# Patient Record
Sex: FEMALE | Race: WHITE | NOT HISPANIC OR LATINO | URBAN - METROPOLITAN AREA
[De-identification: names, ages, dates, MRNs, and addresses within clinical notes are randomized per-mention and may not be internally consistent; named-entity substitution may affect disease eponyms.]

---

## 2023-08-22 ENCOUNTER — HOSPITAL ENCOUNTER (EMERGENCY)
Facility: HOSPITAL | Age: 43
Discharge: HOME | End: 2023-08-22
Attending: EMERGENCY MEDICINE
Payer: MEDICAID

## 2023-08-22 VITALS
SYSTOLIC BLOOD PRESSURE: 103 MMHG | DIASTOLIC BLOOD PRESSURE: 73 MMHG | RESPIRATION RATE: 16 BRPM | HEART RATE: 93 BPM | TEMPERATURE: 98 F | OXYGEN SATURATION: 97 %

## 2023-08-22 DIAGNOSIS — R52 PAIN AGGRAVATED BY PHYSICAL ACTIVITY: ICD-10-CM

## 2023-08-22 DIAGNOSIS — M25.552 PAIN OF LEFT HIP: Primary | ICD-10-CM

## 2023-08-22 PROCEDURE — 99283 EMERGENCY DEPT VISIT LOW MDM: CPT

## 2023-08-22 RX ORDER — KETOROLAC TROMETHAMINE 10 MG/1
10 TABLET, FILM COATED ORAL EVERY 6 HOURS
Qty: 20 TABLET | Refills: 0 | Status: SHIPPED | OUTPATIENT
Start: 2023-08-22 | End: 2023-08-27

## 2023-08-22 NOTE — ED PROVIDER NOTES
Encounter Date: 8/22/2023       History     Chief Complaint   Patient presents with    Hip Pain     Lt hip pain  worsening each week from a fall a few weeks ago.-  lmp- 8/3/23     43-year-old complaints of left hip pain    The history is provided by the patient. No  was used.     Review of patient's allergies indicates:  No Known Allergies  No past medical history on file.  No past surgical history on file.  No family history on file.     Review of Systems   Constitutional: Negative.    HENT: Negative.     Eyes: Negative.    Respiratory: Negative.     Cardiovascular: Negative.    Gastrointestinal: Negative.    Endocrine: Negative.    Genitourinary: Negative.    Musculoskeletal:  Positive for joint swelling and myalgias.   Skin: Negative.    Allergic/Immunologic: Negative.    Neurological: Negative.    Hematological: Negative.    Psychiatric/Behavioral: Negative.     All other systems reviewed and are negative.      Physical Exam     Initial Vitals [08/22/23 1541]   BP Pulse Resp Temp SpO2   103/73 93 16 98 °F (36.7 °C) 97 %      MAP       --         Physical Exam    Nursing note and vitals reviewed.  Constitutional: She appears well-developed and well-nourished.   HENT:   Head: Normocephalic and atraumatic.   Right Ear: External ear normal.   Left Ear: External ear normal.   Nose: Nose normal.   Mouth/Throat: Oropharynx is clear and moist.   Eyes: Conjunctivae and EOM are normal. Pupils are equal, round, and reactive to light.   Neck: Neck supple.   Normal range of motion.  Cardiovascular:  Normal rate, regular rhythm, normal heart sounds and intact distal pulses.           Pulmonary/Chest: Breath sounds normal.   Abdominal: Abdomen is soft. Bowel sounds are normal.   Musculoskeletal:         General: Tenderness present.      Cervical back: Normal range of motion and neck supple.     Neurological: She is alert and oriented to person, place, and time. She has normal strength and normal reflexes.  GCS score is 15. GCS eye subscore is 4. GCS verbal subscore is 5. GCS motor subscore is 6.   Skin: Skin is warm and dry. Capillary refill takes less than 2 seconds.   Psychiatric: She has a normal mood and affect. Her behavior is normal. Judgment and thought content normal.         ED Course   Procedures  Labs Reviewed - No data to display       Imaging Results              X-Ray Hip 2 or 3 views Left (with Pelvis when performed) (Final result)  Result time 08/22/23 18:14:50   Procedure changed from X-Ray Hip 2 or 3 views Right (with Pelvis when performed)     Final result by Jose Antonio Hughes MD (08/22/23 18:14:50)                   Impression:      No acute osseous process appreciated.      Electronically signed by: Jose Antonio Hughes  Date:    08/22/2023  Time:    18:14               Narrative:    EXAMINATION:  XR HIP WITH PELVIS WHEN PERFORMED, 2 OR 3 VIEWS LEFT    CLINICAL HISTORY:  pain; Pain, unspecified    TECHNIQUE:  AP view of the pelvis with frontal and frog leg lateral views of the left hip.    COMPARISON:  No relevant comparison studies available at time of dictation.    FINDINGS:  No acute fracture identified.  Left hip aligned with preserved joint space.                                       Medications - No data to display  Medical Decision Making  Awake alert and oriented ambulatory 43-year-old female presents to the ER with complaints of left hip pain.  Patient reports she fell 3 weeks ago and has had sharp intermittent pain to the lower back region.  Patient reports like shooting pain into the groin and down the thigh.  Patient is in no distress.  Gait steady without assistive devices.  Vital signs stable.  Patient ambulates with a mild limp to the left side.  No surface trauma or ecchymosis noted.  No erythema warmth.  Tenderness over the sciatica with radiation.  Patient denies incontinence.  Limited range of motion with abduction.  Tolerated adduction.  Distal motor neurovascular status are intact.   All other review of systems within normal limits.  GCS 15 cranial nerves 2-12 intact neuro focal intact    Amount and/or Complexity of Data Reviewed  Radiology: ordered.     Details: No acute process identified.                               Clinical Impression:   Final diagnoses:  [R52] Pain aggravated by physical activity  [M25.552] Pain of left hip (Primary)        ED Disposition Condition    Discharge Stable          ED Prescriptions       Medication Sig Dispense Start Date End Date Auth. Provider    ketorolac (TORADOL) 10 mg tablet Take 1 tablet (10 mg total) by mouth every 6 (six) hours. for 5 days 20 tablet 8/22/2023 8/27/2023 Teresa Wright NP          Follow-up Information    None          Teresa Wright NP  08/22/23 [id number]

## 2023-08-22 NOTE — Clinical Note
"Lisseth"Virginia Clarke was seen and treated in our emergency department on 8/22/2023.  She may return to work on 08/24/2023.       If you have any questions or concerns, please don't hesitate to call.      Leo Baptiste MD"

## 2023-08-22 NOTE — Clinical Note
"Lisseth Goodrichstanislaw Clarke was seen and treated in our emergency department on 8/22/2023.  She may return to work on 08/23/2023.       If you have any questions or concerns, please don't hesitate to call.      Teresa Wright, LOUISA"

## 2023-08-22 NOTE — DISCHARGE INSTRUCTIONS
Patient will be discharged home.  Encouraged to alternate heat and cold compresses.  Take medications as prescribed.  Passive range of motion exercises.  Return ER for any worsening symptoms

## 2025-08-31 ENCOUNTER — HOSPITAL ENCOUNTER (EMERGENCY)
Facility: HOSPITAL | Age: 45
Discharge: HOME | End: 2025-08-31
Attending: EMERGENCY MEDICINE
Payer: COMMERCIAL

## 2025-08-31 VITALS
TEMPERATURE: 98 F | BODY MASS INDEX: 32.6 KG/M2 | SYSTOLIC BLOOD PRESSURE: 123 MMHG | HEART RATE: 87 BPM | OXYGEN SATURATION: 99 % | RESPIRATION RATE: 20 BRPM | HEIGHT: 63 IN | DIASTOLIC BLOOD PRESSURE: 81 MMHG | WEIGHT: 184 LBS

## 2025-08-31 DIAGNOSIS — M25.561 ACUTE PAIN OF RIGHT KNEE: Primary | ICD-10-CM

## 2025-08-31 DIAGNOSIS — R52 PAIN: ICD-10-CM

## 2025-08-31 PROCEDURE — 96372 THER/PROPH/DIAG INJ SC/IM: CPT | Performed by: EMERGENCY MEDICINE

## 2025-08-31 PROCEDURE — 63600175 PHARM REV CODE 636 W HCPCS: Mod: JZ,TB | Performed by: EMERGENCY MEDICINE

## 2025-08-31 PROCEDURE — 99284 EMERGENCY DEPT VISIT MOD MDM: CPT | Mod: 25

## 2025-08-31 RX ORDER — METHYLPREDNISOLONE ACETATE 80 MG/ML
80 INJECTION, SUSPENSION INTRA-ARTICULAR; INTRALESIONAL; INTRAMUSCULAR; SOFT TISSUE
Status: COMPLETED | OUTPATIENT
Start: 2025-08-31 | End: 2025-08-31

## 2025-08-31 RX ORDER — IBUPROFEN 600 MG/1
600 TABLET, FILM COATED ORAL EVERY 6 HOURS PRN
Qty: 20 TABLET | Refills: 0 | Status: SHIPPED | OUTPATIENT
Start: 2025-08-31

## 2025-08-31 RX ADMIN — METHYLPREDNISOLONE ACETATE 80 MG: 80 INJECTION, SUSPENSION INTRA-ARTICULAR; INTRALESIONAL; INTRAMUSCULAR; SOFT TISSUE at 04:08
